# Patient Record
Sex: FEMALE | Race: WHITE
[De-identification: names, ages, dates, MRNs, and addresses within clinical notes are randomized per-mention and may not be internally consistent; named-entity substitution may affect disease eponyms.]

---

## 2017-11-30 ENCOUNTER — HOSPITAL ENCOUNTER (OUTPATIENT)
Dept: HOSPITAL 76 - SDS | Age: 67
Discharge: HOME | End: 2017-11-30
Attending: OPHTHALMOLOGY
Payer: MEDICARE

## 2017-11-30 VITALS — DIASTOLIC BLOOD PRESSURE: 58 MMHG | SYSTOLIC BLOOD PRESSURE: 180 MMHG

## 2017-11-30 DIAGNOSIS — H25.11: Primary | ICD-10-CM

## 2017-11-30 DIAGNOSIS — H04.123: ICD-10-CM

## 2017-11-30 DIAGNOSIS — F17.210: ICD-10-CM

## 2017-11-30 DIAGNOSIS — F41.9: ICD-10-CM

## 2017-11-30 PROCEDURE — 08RJ3JZ REPLACEMENT OF RIGHT LENS WITH SYNTHETIC SUBSTITUTE, PERCUTANEOUS APPROACH: ICD-10-PCS | Performed by: OPHTHALMOLOGY

## 2017-11-30 PROCEDURE — 66984 XCAPSL CTRC RMVL W/O ECP: CPT

## 2017-11-30 RX ADMIN — SODIUM CHONDROITIN SULFATE / SODIUM HYALURONATE ONE SYR: 0.55-0.5 INJECTION INTRAOCULAR at 11:56

## 2017-11-30 RX ADMIN — Medication ONE ML: at 11:57

## 2017-11-30 RX ADMIN — SODIUM CHLORIDE, POTASSIUM CHLORIDE, SODIUM LACTATE AND CALCIUM CHLORIDE ONE MLS: 600; 310; 30; 20 INJECTION, SOLUTION INTRAVENOUS at 11:23

## 2017-11-30 RX ADMIN — BRIMONIDINE TARTRATE ONE DROPS: 2 SOLUTION OPHTHALMIC at 11:56

## 2017-11-30 RX ADMIN — TIMOLOL MALEATE ONE DROPS: 5 SOLUTION OPHTHALMIC at 11:57

## 2017-11-30 RX ADMIN — PROPARACAINE HYDROCHLORIDE ONE DROPS: 5 SOLUTION/ DROPS OPHTHALMIC at 11:55

## 2017-11-30 NOTE — OPERATIVE REPORT
DATE OF SURGERY:  11/30/2017 00:00:00

 

PREOPERATIVE DIAGNOSIS:  Visually significant cataract, right eye. This was her 
first cataract surgery.

 

POSTOPERATIVE DIAGNOSIS:  Visually significant cataract, right eye. This was 
her first cataract surgery.

 

NAME OF PROCEDURE:  Phacoemulsification posterior chamber intraocular lens 
implant, right eye.

 

SURGEON:  Percy Villatoro MD

 

ANESTHESIA:  Monitored anesthesia care.

 

COMPLICATIONS: None.

 

OPERATIVE INDICATIONS: This is a 67-year-old woman with progressive vision loss 
in the right eye due to 3-4+ nuclear sclerotic cataract. Best corrected visual 
acuity was 20/80 with glare to hand motion in the right eye. Indications for 
surgery were overall decrease in vision, difficulty seeing words on a computer 
screen, difficulty reading, difficulty seeing words and game scores on TV, 
difficulty driving in low light or at night, difficulty driving at night 
because of headlights from other vehicles and/or streetlights, and difficulty 
with glare and bright lights in any situation. She was consented at length 
concerning the risks and benefits of cataract surgery after which she expressed 
a desire to proceed with surgery.

 

OPERATIVE PROCEDURE: The patient was taken to the OR #3 and placed under 
monitored anesthesia care. A surgical time-out was conducted confirming correct 
patient, correct procedure and correct surgical site. She was given topical 
anesthesia and then prepped and draped in the usual sterile fashion. The eye 
was entered at the 12 and 9 o'clock positions. Intracameral Shugarcaine was 
injected into the anterior chamber followed by Viscoat. A continuous tear 
curvilinear capsulorrhexis was performed. The nucleus was hydrodissected and 
phacoemulsified. The cortex was evacuated using automated infusion and 
aspiration (I&A). Provisc was injected into the capsular bag and a 24.0 diopter 
intraocular lens was inserted into the bag. Approximately 0.7 mL of a mixture 
triamcinolone, moxifloxacin, and vancomycin was injected subconjunctivally in 
the superior quadrant for infection and inflammation prophylaxis. I/A was used 
to evacuate the viscoelastic materials. The eye was inflated to physiologic 
pressure using balanced salt solution and found to be watertight. The patient 
was taken from the operating room in good condition and given postoperative 
instructions.

 

 

 

DD:11/30/2017 12:16:00  DT: 11/30/2017 12:50  JOB #: [id number]  EXT JOB #:[id number]

[facility]

## 2020-04-03 ENCOUNTER — HOSPITAL ENCOUNTER (EMERGENCY)
Dept: HOSPITAL 76 - ED | Age: 70
Discharge: HOME | End: 2020-04-03
Payer: MEDICARE

## 2020-04-03 VITALS — SYSTOLIC BLOOD PRESSURE: 141 MMHG | DIASTOLIC BLOOD PRESSURE: 87 MMHG

## 2020-04-03 DIAGNOSIS — Z76.0: Primary | ICD-10-CM

## 2020-04-03 DIAGNOSIS — F41.9: ICD-10-CM

## 2020-04-03 PROCEDURE — 99283 EMERGENCY DEPT VISIT LOW MDM: CPT

## 2020-04-03 PROCEDURE — 99282 EMERGENCY DEPT VISIT SF MDM: CPT

## 2020-04-03 NOTE — ED PHYSICIAN DOCUMENTATION
History of Present Illness





- Stated complaint


Stated Complaint: MED REFILL





- Chief complaint


Chief Complaint: General





- Additonal information


Additional information: 





Patient comes emergency department complaining of anxiety and having just run 

out of her clonazepam, which she takes chronically.  Patient states she has been

afraid to go out because of the coronavirus reports, and that she called her 

primary care physician's office and they said they could not see her anytime in 

the next week.  She states that they told her to come to the walk-in clinic or 

emergency department to try to get her meds refilled.  Patient states that she i

s not having an acute anxiety attack, but that her anxiety has been higher 

because of the current situation.  She states that she has been watching the 

news every day to see how the spread of coronavirus is doing.  She denies any 

fevers or chills.  No cough.  No shortness of breath.  No chest pain, nausea, or

vomiting.  No abdominal pain.  She states she has not felt ill in any way.  No 

other complaints at this time.





Review of Systems


Ten Systems: 10 systems reviewed and negative


Constitutional: reports: Reviewed and negative


Eyes: reports: Reviewed and negative


Ears: reports: Reviewed and negative


Nose: reports: Reviewed and negative


Throat: reports: Reviewed and negative


Cardiac: reports: Reviewed and negative


Respiratory: reports: Reviewed and negative


GI: reports: Reviewed and negative


: reports: Reviewed and negative


Skin: reports: Reviewed and negative


Musculoskeletal: reports: Reviewed and negative


Neurologic: reports: Reviewed and negative


Psychiatric: reports: Anxiety


Endocrine: reports: Reviewed and negative


Immunocompromised: reports: Reviewed and negative





PD PAST MEDICAL HISTORY





- Past Medical History


Cardiovascular: 


Respiratory: None


Endocrine/Autoimmune: None


GI: None


: None


HEENT: None


Psych: Anxiety


Musculoskeletal: None


Derm: None


Other Past Medical History: Vertigo





- Past Surgical History


/GYN: Hysterectomy





- Present Medications


Home Medications: 


                                Ambulatory Orders











 Medication  Instructions  Recorded  Confirmed


 


Propranolol [Inderal] 40 mg PO BID 11/29/17 11/30/17


 


clonazePAM [Clonazepam] 0.5 mg PO DAILY 11/29/17 11/30/17


 


clonazePAM [Clonazepam] 2 mg PO BID 15 Days #30 tablet 04/03/20 














- Allergies


Allergies/Adverse Reactions: 


                                    Allergies











Allergy/AdvReac Type Severity Reaction Status Date / Time


 


No Known Drug Allergies Allergy   Verified 04/03/20 10:59














- Social History


Does the pt smoke?: No


Smoking Status: Never smoker


Does the pt drink ETOH?: No


Does the pt have substance abuse?: No





- Immunizations


Immunizations are current?: Yes





- POLST


Patient has POLST: No





PD ED PE NORMAL





- Vitals


Vital signs reviewed: Yes





- General


General: Alert and oriented X 3, No acute distress





- HEENT


HEENT: Atraumatic, PERRL, EOMI, Moist mucous membranes





- Neck


Neck: Supple, no meningeal sign





- Cardiac


Cardiac: RRR, No murmur





- Respiratory


Respiratory: No respiratory distress, Clear bilaterally





- Derm


Derm: Normal color, Warm and dry, No rash





- Extremities


Extremities: No deformity





- Neuro


Neuro: Alert and oriented X 3, CNs 2-12 intact, Other (Grossly normal)





- Psych


Psych: Normal mood, Normal affect





Results





- Vitals


Vitals: 





                               Vital Signs - 24 hr











  04/03/20





  10:56


 


Temperature 36.4 C L


 


Heart Rate 86


 


Respiratory 18





Rate 


 


Blood Pressure 141/87 H


 


O2 Saturation 99








                                     Oxygen











O2 Source                      Room air

















PD MEDICAL DECISION MAKING





- ED course


Complexity details: reviewed old records, re-evaluated patient, considered 

differential, d/w patient


ED course: 





I discussed with the patient that we generally do not refill chronic narcotics 

or benzodiazepines from the emergency department, and that in general, this shou

ld be the role of the patient's primary care physician.  However, due to the 

increased concern over coronavirus and the mandate to shelter in place, with 

associated concerns among the population, Combined with the patient's primary 

care clinic not been able to see her for at least a week, I will go ahead and 

give the patient a 2-week refill on her medication.  However, I have advised her

 that she is to make an appointment immediately with her primary care office to 

be seen within the next 2 weeks, and that we cannot repeatedly refill her meds 

for her.  The patient expresses understanding.  We have discussed home 

management of the symptoms, as well as the usual indications for return.





Departure





- Departure


Disposition: 01 Home, Self Care


Clinical Impression: 


 Anxiety





Condition: Good


Instructions:  Anxiety Disorder


Prescriptions: 


clonazePAM [Clonazepam] 2 mg PO BID 15 Days #30 tablet

## 2022-10-06 ENCOUNTER — HOSPITAL ENCOUNTER (OUTPATIENT)
Dept: HOSPITAL 76 - SDS | Age: 72
Discharge: HOME | End: 2022-10-06
Attending: OPHTHALMOLOGY
Payer: MEDICARE

## 2022-10-06 VITALS — SYSTOLIC BLOOD PRESSURE: 118 MMHG | DIASTOLIC BLOOD PRESSURE: 87 MMHG

## 2022-10-06 DIAGNOSIS — H25.12: Primary | ICD-10-CM

## 2022-10-06 DIAGNOSIS — Z98.41: ICD-10-CM

## 2022-10-06 DIAGNOSIS — F41.9: ICD-10-CM

## 2022-10-06 PROCEDURE — 66984 XCAPSL CTRC RMVL W/O ECP: CPT

## 2022-10-06 NOTE — OPERATIVE REPORT
Operative Report





- Other


Other Information/Narrative: 





Date of Surgery: 10/06/22


Preop Dx: Visually significant cataract left eye. Cataract surgery was performed

in the right eye on [id number].


Postop Dx: Same


Procedure: Phacoemulsification with posterior chamber intraocular lens implant 

left eye


Surgeon: Dr. Percy Villatoro


Anesthesia: Monitored anesthesia care


Complications: None


Operative Indications: This is a 72-year-old F with progressive vision loss in 

the left eye due to 4+ nuclear sclerotic and 1+ cortical cataract.   Best 

corrected visual acuity was 20/50 with glare to hand motion vision in the left 

eye.  Indications for surgery were:


-   Overall decrease in vision


-   Difficulty seeing words on a computer screen


-   Difficulty reading


-   Difficulty seeing words, closed captions, or game scores on TV


-   Difficulty driving in low light or at night


-   Difficulty driving at night because of headlights from other vehicles


-   Difficulty with glare or bright lights in any situation


-   Difficulty tracking a golf ball


The patient was consented at length concerning the risks and benefits of 

cataract surgery after which the patient expressed a desire to proceed with 

surgery.  





Operative Procedure:  The patient was taken into OR#3 and placed under monitored

anesthesia care.  A surgical time-out was conducted confirming correct patient, 

correct procedure, and correct surgical site.  The patient was given topical 

anesthesia and then prepped and draped in the usual sterile fashion.  The eye 

was entered at the 6 and 3 oclock positions.  Intracameral Shugarcaine was 

injected into the anterior chamber followed by a dispersive viscoelastic. A 

continuous-tear curvilinear capsulorhexis was performed. The nucleus was hydr

odissected and phacoemulsified.  During phacoemusification a small hole was 

noted in the posterior capsule without vitreous loss.  Dispersive viscoelastic 

was injected through the hole to push vitreous back.  The cohesive viscoelastic 

used to fill the capsule and the 24.0 primary, single-piece, IOL was inserted 

into the bag. The remaining nuclear material was emulsified from the anterior 

chamber. Remaining cortex was evacuated using automated infusion and aspiration.

Infusion and aspiration were used to evacuate the viscoelastic materials from 

the eye. At no time did vitreous present to either would.  The wounds were 

hydrated and the eye inflated to physiologic pressure using balanced salt 

solution.  Approximately 0.25ml of a mixture of triamcinolone and moxifloxacin 

was injected trans-sclerally into the vitreous in the inferotemporal quadrant 

using a 30 gauge cannula.  An additional 0.55ml of a mixture of triamcinolone 

and moxifloxacin was injected subconjunctivally in the superior quadrant for 

infection and inflammation prophylaxis. Wound integrity was checked with Weck-

Yeni sponges.  The patient was taken from the operating room in good condition 

and given post-op instructions.

## 2022-10-06 NOTE — ANESTHESIA POST OP EVALUATION
Anesthesia Post Eval





- Post Anesthesia Eval


Vitals: 





                                Last Vital Signs











Temp  36.1 C L  10/06/22 10:49


 


Pulse  67   10/06/22 10:49


 


Resp  16   10/06/22 10:49


 


BP  118/87 H  10/06/22 10:49


 


Pulse Ox  99   10/06/22 10:49


 


O2 Flow Rate  0   10/06/22 09:33











CV Function Including HR & BP: Stable


Pain Control: Satisfactory


Nausea & Vomiting: Negative


Mental Status: Baseline


Respiratory Status: Airway Patent


Hydration Status: Satisfactory


Anesthesia Complications: None

## 2022-10-06 NOTE — ANESTHESIA
Pre-Anesthesia VS, & Labs





- Diagnosis





L senile combined cataract





- Procedure





L cataract extraction w IOL


Height: 5 ft 5 in





- NPO


>8 hours





- Pregnancy


Is Patient Pregnant?: No





- Lab Results


Lab results reviewed: Yes





Home Medications and Allergies


Home Medications: 


Ambulatory Orders





Cyclobenzaprine [Flexeril] 1 tab PO TID 10/05/22 











                                        





Propranolol [Inderal] 40 mg PO BID 11/29/17 


Cyclobenzaprine [Flexeril] 1 tab PO TID 10/05/22 








Allergies/Adverse Reactions: 


                                    Allergies











Allergy/AdvReac Type Severity Reaction Status Date / Time


 


No Known Drug Allergies Allergy   Verified 10/05/22 13:09














Anes History & Medical History





- Anesthetic History


Anesthesia Complications: reports: No previous complications


Family history of Anesthesia Complications: Denies


Family history of Malignant Hyperthermia: Denies





- Medical History


Cardiovascular: 


Pulmonary: reports: None


Gastrointestinal: reports: None


Urinary: reports: None


Musculoskeletal: reports: Chronic back pain


Endocrine/Autoimmune: reports: None


Skin: reports: None


Smoking Status: Never smoker





- Surgical History


Eyes Ears Nose Throat (EENT): reports: Cataracts


Gynecologic: reports: Hysterectomy





Exam


General: Alert, Oriented x3, Cooperative


Dental: WNL


Mouth Opening: 3 Fingerbreadth


Neck Mobility: Normal


Mallampati classification: II


Thyromental Distance: 4-6 cm


Respiratory: Lungs clear, Normal breath sounds


Cardiovascular: Regular rate


Neurological: Normal speech


Mental/Cognitive Status: Alert/Oriented X3, Normal for patient


Cognitive Status: Within normal limits





Plan


Anesthesia Type: MAC


Consent for Procedure(s) Verified and Reviewed: Yes


Code Status: Attempt Resuscitation


ASA classification: 2-Mild systemic disease


Is this case an emergency?: No